# Patient Record
Sex: MALE | Race: OTHER | URBAN - METROPOLITAN AREA
[De-identification: names, ages, dates, MRNs, and addresses within clinical notes are randomized per-mention and may not be internally consistent; named-entity substitution may affect disease eponyms.]

---

## 2020-11-24 ENCOUNTER — OFFICE VISIT (OUTPATIENT)
Dept: FAMILY MEDICINE CLINIC | Age: 55
End: 2020-11-24
Payer: COMMERCIAL

## 2020-11-24 VITALS
OXYGEN SATURATION: 98 % | HEIGHT: 66 IN | DIASTOLIC BLOOD PRESSURE: 60 MMHG | WEIGHT: 184.6 LBS | HEART RATE: 76 BPM | BODY MASS INDEX: 29.67 KG/M2 | SYSTOLIC BLOOD PRESSURE: 116 MMHG | TEMPERATURE: 97.3 F

## 2020-11-24 DIAGNOSIS — Z00.00 WELL ADULT EXAM: ICD-10-CM

## 2020-11-24 LAB
A/G RATIO: 1.9 (ref 1.1–2.2)
ALBUMIN SERPL-MCNC: 4.5 G/DL (ref 3.4–5)
ALP BLD-CCNC: 75 U/L (ref 40–129)
ALT SERPL-CCNC: 37 U/L (ref 10–40)
ANION GAP SERPL CALCULATED.3IONS-SCNC: 10 MMOL/L (ref 3–16)
AST SERPL-CCNC: 35 U/L (ref 15–37)
BILIRUB SERPL-MCNC: 0.4 MG/DL (ref 0–1)
BUN BLDV-MCNC: 16 MG/DL (ref 7–20)
CALCIUM SERPL-MCNC: 9.6 MG/DL (ref 8.3–10.6)
CHLORIDE BLD-SCNC: 107 MMOL/L (ref 99–110)
CHOLESTEROL, TOTAL: 230 MG/DL (ref 0–199)
CO2: 29 MMOL/L (ref 21–32)
CREAT SERPL-MCNC: 0.7 MG/DL (ref 0.9–1.3)
GFR AFRICAN AMERICAN: >60
GFR NON-AFRICAN AMERICAN: >60
GLOBULIN: 2.4 G/DL
GLUCOSE BLD-MCNC: 90 MG/DL (ref 70–99)
HCT VFR BLD CALC: 39.1 % (ref 40.5–52.5)
HDLC SERPL-MCNC: 50 MG/DL (ref 40–60)
HEMOGLOBIN: 13.2 G/DL (ref 13.5–17.5)
LDL CHOLESTEROL CALCULATED: 141 MG/DL
MCH RBC QN AUTO: 29.2 PG (ref 26–34)
MCHC RBC AUTO-ENTMCNC: 33.7 G/DL (ref 31–36)
MCV RBC AUTO: 86.7 FL (ref 80–100)
PDW BLD-RTO: 13.4 % (ref 12.4–15.4)
PLATELET # BLD: 175 K/UL (ref 135–450)
PMV BLD AUTO: 8.9 FL (ref 5–10.5)
POTASSIUM SERPL-SCNC: 5 MMOL/L (ref 3.5–5.1)
RBC # BLD: 4.51 M/UL (ref 4.2–5.9)
SODIUM BLD-SCNC: 146 MMOL/L (ref 136–145)
TOTAL PROTEIN: 6.9 G/DL (ref 6.4–8.2)
TRIGL SERPL-MCNC: 193 MG/DL (ref 0–150)
TSH REFLEX: 1.42 UIU/ML (ref 0.27–4.2)
VLDLC SERPL CALC-MCNC: 39 MG/DL
WBC # BLD: 3.8 K/UL (ref 4–11)

## 2020-11-24 PROCEDURE — 99204 OFFICE O/P NEW MOD 45 MIN: CPT | Performed by: NURSE PRACTITIONER

## 2020-11-24 PROCEDURE — 3017F COLORECTAL CA SCREEN DOC REV: CPT | Performed by: NURSE PRACTITIONER

## 2020-11-24 PROCEDURE — G8484 FLU IMMUNIZE NO ADMIN: HCPCS | Performed by: NURSE PRACTITIONER

## 2020-11-24 PROCEDURE — 99386 PREV VISIT NEW AGE 40-64: CPT | Performed by: NURSE PRACTITIONER

## 2020-11-24 PROCEDURE — G8419 CALC BMI OUT NRM PARAM NOF/U: HCPCS | Performed by: NURSE PRACTITIONER

## 2020-11-24 PROCEDURE — G8427 DOCREV CUR MEDS BY ELIG CLIN: HCPCS | Performed by: NURSE PRACTITIONER

## 2020-11-24 PROCEDURE — 1036F TOBACCO NON-USER: CPT | Performed by: NURSE PRACTITIONER

## 2020-11-24 RX ORDER — MULTIVITAMIN WITH IRON
100 TABLET ORAL DAILY
COMMUNITY

## 2020-11-24 RX ORDER — METOPROLOL SUCCINATE 25 MG/1
25 TABLET, EXTENDED RELEASE ORAL DAILY
COMMUNITY

## 2020-11-24 RX ORDER — ASPIRIN 81 MG/1
81 TABLET ORAL DAILY
COMMUNITY

## 2020-11-24 RX ORDER — ATORVASTATIN CALCIUM 40 MG/1
40 TABLET, FILM COATED ORAL DAILY
COMMUNITY

## 2020-11-24 RX ORDER — ACETAMINOPHEN 160 MG
TABLET,DISINTEGRATING ORAL
COMMUNITY

## 2020-11-24 RX ORDER — UBIDECARENONE 75 MG
50 CAPSULE ORAL DAILY
COMMUNITY

## 2020-11-24 ASSESSMENT — PATIENT HEALTH QUESTIONNAIRE - PHQ9
1. LITTLE INTEREST OR PLEASURE IN DOING THINGS: 0
SUM OF ALL RESPONSES TO PHQ9 QUESTIONS 1 & 2: 0
SUM OF ALL RESPONSES TO PHQ QUESTIONS 1-9: 0
SUM OF ALL RESPONSES TO PHQ QUESTIONS 1-9: 0
2. FEELING DOWN, DEPRESSED OR HOPELESS: 0
SUM OF ALL RESPONSES TO PHQ QUESTIONS 1-9: 0

## 2020-11-24 ASSESSMENT — ENCOUNTER SYMPTOMS
ABDOMINAL PAIN: 0
SHORTNESS OF BREATH: 0
DIARRHEA: 0
COUGH: 0
VOMITING: 0
BLOOD IN STOOL: 0
NAUSEA: 0
CHEST TIGHTNESS: 0
WHEEZING: 0
CONSTIPATION: 0

## 2020-11-24 NOTE — PROGRESS NOTES
2020    Jose Lui (:  1965) is a 48 y.o. male, here for evaluation of the following medical concerns:  Patient is here for annual physical.    Chief Complaint   Patient presents with    New Patient    Other     pt had hard time sleeping at night      [facility] : #[id number]    Past medical, surgical, family and social history, as well as current medications and allergies, were reviewed and updated with the patient. Dental: up-to-date  Eye: up-to-date  Colonoscopy: 4.5 years in [facility]  Exercise: works out  Diet: generally healthy diet  Work: not at this time  Social:  , moved here from [address] [place] [address]    Nasal Congestion:  Patient states he has a hard time breathing when he lays down at night through his nose. He states his nose is always stuffy and much worse at night. He states this has been going on for many years. He states both nostrils are bad and his nose has never been broken. He has not tried anything for this in the past.     Coronary Artery Disease:  Patient states he had a heart attack in 2018 at [facility]. He states he had a stent placed in his Circumflex. He saw his cardiologist (Dr. Jeanie Jalloh) approximately one month ago. He states he does not have any chest pain, shortness of breath, dizziness, nausea, vomiting or diaphoresis. He states he has stopped taking all of his medications because he thinks they made him feel bad for a few days. He is still taking a baby aspirin, vitamin D, vitamin B12 and B6 daily. Review of Systems   Constitutional: Negative for chills, diaphoresis, fatigue and fever. HENT: Negative. Respiratory: Negative for cough, chest tightness, shortness of breath and wheezing. Cardiovascular: Negative for chest pain, palpitations and leg swelling. Gastrointestinal: Negative for abdominal pain, blood in stool, constipation, diarrhea, nausea and vomiting. Genitourinary: Negative.     Musculoskeletal: Negative. Skin: Negative. Neurological: Negative for dizziness, weakness, light-headedness and headaches. Psychiatric/Behavioral: Negative. Prior to Visit Medications    Medication Sig Taking? Authorizing Provider   metoprolol succinate (TOPROL XL) 25 MG extended release tablet Take 25 mg by mouth daily Yes Historical Provider, MD   atorvastatin (LIPITOR) 40 MG tablet Take 40 mg by mouth daily Yes Historical Provider, MD   aspirin 81 MG EC tablet Take 81 mg by mouth daily Yes Historical Provider, MD   Cholecalciferol (VITAMIN D3) 50 MCG (2000 UT) CAPS Take by mouth Yes Historical Provider, MD   vitamin B-12 (CYANOCOBALAMIN) 100 MCG tablet Take 50 mcg by mouth daily Yes Historical Provider, MD   vitamin B-6 (PYRIDOXINE) 100 MG tablet Take 100 mg by mouth daily Yes Historical Provider, MD      No Known Allergies    History reviewed. No pertinent past medical history. History reviewed. No pertinent surgical history. Social History     Tobacco Use    Smoking status: Never Smoker    Smokeless tobacco: Never Used   Substance Use Topics    Alcohol use: Yes     Comment: sometime     Drug use: Never      History reviewed. No pertinent family history. Vitals:    11/24/20 1135   BP: 116/60   Pulse: 76   Temp: 97.3 °F (36.3 °C)   SpO2: 98%   Weight: 184 lb 9.6 oz (83.7 kg)   Height: 5' 6\" (1.676 m)     Estimated body mass index is 29.8 kg/m² as calculated from the following:    Height as of this encounter: 5' 6\" (1.676 m). Weight as of this encounter: 184 lb 9.6 oz (83.7 kg). Physical Exam  Vitals signs and nursing note reviewed. Constitutional:       General: He is awake. He is not in acute distress. Appearance: Normal appearance. He is well-developed, well-groomed and normal weight. He is not ill-appearing, toxic-appearing or diaphoretic. HENT:      Head: Normocephalic and atraumatic.       Right Ear: Tympanic membrane, ear canal and external ear normal.      Left Ear: Tympanic membrane, ear canal and external ear normal.      Nose: Nose normal.      Mouth/Throat:      Mouth: Mucous membranes are moist.      Pharynx: Oropharynx is clear. Eyes:      Extraocular Movements: Extraocular movements intact. Conjunctiva/sclera: Conjunctivae normal.      Pupils: Pupils are equal, round, and reactive to light. Neck:      Musculoskeletal: Full passive range of motion without pain, normal range of motion and neck supple. Thyroid: No thyroid mass, thyromegaly or thyroid tenderness. Vascular: No carotid bruit. Cardiovascular:      Rate and Rhythm: Normal rate and regular rhythm. Heart sounds: Normal heart sounds and S1 normal. No murmur. Pulmonary:      Effort: Pulmonary effort is normal.      Breath sounds: Normal breath sounds and air entry. Abdominal:      General: Abdomen is flat. Bowel sounds are normal.   Musculoskeletal:      Right lower leg: No edema. Left lower leg: No edema. Lymphadenopathy:      Head:      Right side of head: No submental, submandibular, tonsillar, preauricular or posterior auricular adenopathy. Left side of head: No submental, submandibular, tonsillar, preauricular or posterior auricular adenopathy. Cervical: No cervical adenopathy. Upper Body:      Right upper body: No supraclavicular adenopathy. Left upper body: No supraclavicular adenopathy. Skin:     General: Skin is warm and dry. Capillary Refill: Capillary refill takes less than 2 seconds. Neurological:      General: No focal deficit present. Mental Status: He is alert and oriented to person, place, and time. GCS: GCS eye subscore is 4. GCS verbal subscore is 5. GCS motor subscore is 6. Psychiatric:         Attention and Perception: Attention normal.         Mood and Affect: Mood normal.         Speech: Speech normal.         Behavior: Behavior normal. Behavior is cooperative. Thought Content:  Thought content normal.         Judgment: Judgment normal.     ASSESSMENT/PLAN:  1. Well adult exam  Labs drawn today, will call with results  - CBC; Future  - Comprehensive Metabolic Panel; Future  - Hemoglobin A1C; Future  - Lipid Panel; Future  - TSH with Reflex; Future    2. Chronic nasal congestion  - Leoncio Alcantara MD, Otolaryngology, [facility]    3. Coronary artery disease involving native coronary artery of native heart without angina pectoris  Stable/start taking all medications as prescribed. Return in about 6 months (around 5/24/2021).

## 2020-11-24 NOTE — PATIENT INSTRUCTIONS
??? ???? ? ??? ?????????? ???????? ??????????? ??? ????????????????? ??????? ? ????????????? ?????? ?????.  · ? ?????????? ??????????????? ?????? ????????????.  · ?????????? ??????????? ???????? 2 ???????? ? ????. ??????? ??????? ????? ?????????????? ???????? ????? ???????? ? ????????????? ??????? ?? ?????????.  ?????????? ???????????? ????? ?? ????? ???????? ? ??????????? ?????? ????????????. ??? ???????????? ???????? ??????? ??????????? ?? ?????? ??????.  · ??????????. ??? ???? ????????? ???, ??? ????? ??? ?????? ? ??????????? ?? ?????? ????????? ???????? ? ?????? ????????, ??????? ????? ???????? ???? ???????? ? ????????.  · ???????????? ????????. ?????????? ???????????? ???????? ?? ????? ???????? ??????? ? ?????. ???? ???????, ??? ????? ??? ??????? ????????? ???????????? ????????, ?????? ?? ?????? ????????, ??????????? ????????? ???????? ? ?????? ????????.  · ???????????? ?????????????? ??????. ??????? ? ???????? ?????, ??????? ?? ??? ??????? ?????? ????? ?? ?????? ???? ?????????????? ?????? -- ???????????????????? ??????? (???). ??????????? ?????????? ?? ??????, ??????????? ?? ???????? ????? ??????? ???? ??????. ????????? ???????? ??????????? ???????? ???????????? ? ????? ????? ??????? ?? ????? ??????.  · ???????? ??????. ??????? ? ???????? ?????, ??????? ?? ??? ????? ??????? ??? ????????? ????????? ???????.  · ??????. ????????? ???????? ??????????? ????????? ????????? ???????????? ??? ????????? ???????? ? ?????? ?????????? ??????????? ???? ??????? ? 50 ???.  · ????. ???????? ?????, ???? ?? ???????? ? ???? ?????-???? ????????? ?????. ?? ?????? ?????? ????? ??? ??????????? ????, ????????? ?????? ?? ???????.  · ??? ???????? ?????????. ??? ?????????? ????????????? ??? ????????? ???? ???????? ????????? ??????? ? 50 ???. ??? ????? ????????? ???? ?? ?????????? ??????. ???? ????????? ???, ??? ????? ??????? ????????? ???????????? ? ??????????? ?? ???????? ? ??????? ???????? ?????. ? ???????? ????? ??????? ???????? ? ??????? ??????? ??????? ?????, ????????? ? ????????????????, ? ??????? ???? ???????? ????????? ? ????? ?????????, ??????, ????? ??? ?????.  · ???? ???????? ? ????????. ?? ???? ?????? ???? ? 4-6 ??? ??????? ????????? ?????? ????? ???????? ???????? ? ????????. ???? ?????????? ????? ???????, ??? ??? ???????, ???????????? ????????, ??????? ???????????, ??????? ??? ???????? ??????, ??? ?????? ????? ???????? ???????? ??? ???????? ? ??????? ????????? 10 ???.  · ????????? ??????? ?????. ??????? ? ???????? ?????, ??????? ?? ??? ?????? ???????????? ??? ????????? ?????????. ??? ????? ????????????? ????????????, ???? ?? ?????-?????? ?????? ??? ? ?????? ????????, ?????, ?????? ??? ??????? ???? ?????????.  ????? ????? ?????????? ?? ????????  ??????????? ?????????? ?? ?????????? ?????? ???????? ? ??????????? ??????????? ? ????? ??? ????????? ????? ??? ??????????? ??? ?????????.  ??? ????? ???????? ?????????????? ???????????  ????????? ??   [email or web address]  ??????? [id number] ? ???? ??????, ????? ?????? ?????? ? \"????????? ????????, ??????? ? ???????? ?? 50 ?? 65 ???: ??????????? ??????????.\"  ????????????? ??: ??? 27, 2020               ?????? ??????????: 12.6  © [id number] [employer]. ??????????? ?????????? ???????????? ?? ???????? ??? ?????????? ? ??????? ???????????????. ???? ? ??? ????????? ??????? ? ????? ? ??????????? ?????????? ??? ?????? ???????????, ?????? ??????? ?????????? ? ?????? ??????????? ? ??????? ???????????????. [employer] Inc[employer] ?? ????? ?????-???? ???????????? ??? ??????????? ??????????????? ?? ????????????? ???? ?????? ??????????. Patient Education        ????????? ????????, ??????? ? ???????? ?? 50 ?? 65 ???: ??????????? ??????????   Well Visit, Men 48 to 72: Care Instructions  ?????????? ?? ?????  ??????? ? ????? ????? ?????? ?????????? ????????. ???? ???????? ????? ????????? ?????? ???????? ? ????? ???? ??????????? ???????????? ?? ?????? ?????. ????? ????, ???? ??????????? ?????? ????????????. ??? ???????????? ???????? ??????? ??????????? ?? ?????? ??????.  · ??????????. ??? ???? ????????? ???, ??? ????? ??? ?????? ? ??????????? ?? ?????? ????????? ???????? ? ?????? ????????, ??????? ????? ???????? ???? ???????? ? ????????.  · ???????????? ????????. ?????????? ???????????? ???????? ?? ????? ???????? ??????? ? ?????. ???? ???????, ??? ????? ??? ??????? ????????? ???????????? ????????, ?????? ?? ?????? ????????, ??????????? ????????? ???????? ? ?????? ????????.  · ???????????? ?????????????? ??????. ??????? ? ???????? ?????, ??????? ?? ??? ??????? ?????? ????? ?? ?????? ???? ?????????????? ?????? -- ???????????????????? ??????? (???). ??????????? ?????????? ?? ??????, ??????????? ?? ???????? ????? ??????? ???? ??????. ????????? ???????? ??????????? ???????? ???????????? ? ????? ????? ??????? ?? ????? ??????.  · ???????? ??????. ??????? ? ???????? ?????, ??????? ?? ??? ????? ??????? ??? ????????? ????????? ???????.  · ??????. ????????? ???????? ??????????? ????????? ????????? ???????????? ??? ????????? ???????? ? ?????? ?????????? ??????????? ???? ??????? ? 50 ???.  · ????. ???????? ?????, ???? ?? ???????? ? ???? ?????-???? ????????? ?????. ?? ?????? ?????? ????? ??? ??????????? ????, ????????? ?????? ?? ???????.  · ??? ???????? ?????????. ??? ?????????? ????????????? ??? ????????? ???? ???????? ????????? ??????? ? 50 ???. ??? ????? ????????? ???? ?? ?????????? ??????. ???? ????????? ???, ??? ????? ??????? ????????? ???????????? ? ??????????? ?? ???????? ? ??????? ???????? ?????. ? ???????? ????? ??????? ???????? ? ??????? ??????? ??????? ?????, ????????? ? ????????????????, ? ??????? ???? ???????? ????????? ? ????? ?????????, ??????, ????? ??? ?????.  · ???? ???????? ? ????????. ?? ???? ?????? ???? ? 4-6 ??? ??????? ????????? ?????? ????? ???????? ???????? ? ????????. ???? ?????????? ????? ???????, ??? ??? ???????, ???????????? ????????, ??????? ???????????, ??????? ??? ???????? ??????, ??? ?????? ????? ???????? ???????? ??? ???????? ? ??????? ????????? 10 ???.  · ????????? ??????? ?????. ??????? ? ???????? ?????, ??????? ?? ??? ?????? ???????????? ??? ????????? ?????????. ??? ????? ????????????? ????????????, ???? ?? ?????-?????? ?????? ??? ? ?????? ????????, ?????, ?????? ??? ??????? ???? ?????????.  ????? ????? ?????????? ?? ????????  ??????????? ?????????? ?? ?????????? ?????? ???????? ? ??????????? ??????????? ? ????? ??? ????????? ????? ??? ??????????? ??? ?????????.  ??? ????? ???????? ?????????????? ???????????  ????????? ??   [email or web address]  ??????? [id number] ? ???? ??????, ????? ?????? ?????? ? \"????????? ????????, ??????? ? ???????? ?? 50 ?? 65 ???: ??????????? ??????????.\"  ????????????? ??: ??? 27, 2020               ?????? ??????????: 12.6  © [id number] [employer]wise, [employer].    ??????????? ?????????? ???????????? ?? ???????? ??? ?????????? ? ??????? ???????????????. ???? ? ??? ????????? ??????? ? ????? ? ??????????? ?????????? ??? ?????? ???????????, ?????? ??????? ?????????? ? ?????? ??????????? ? ??????? ???????????????. [employer]wise, Inc[employer] ?? ????? ?????-???? ???????????? ??? ??????????? ??????????????? ?? ????????????? ???? ?????? ??????????.

## 2020-11-25 PROBLEM — I21.9 MYOCARDIAL INFARCT (HCC): Status: ACTIVE | Noted: 2018-01-01

## 2020-11-25 LAB
ESTIMATED AVERAGE GLUCOSE: 108.3 MG/DL
HBA1C MFR BLD: 5.4 %

## 2020-12-11 ENCOUNTER — TELEPHONE (OUTPATIENT)
Dept: FAMILY MEDICINE CLINIC | Age: 55
End: 2020-12-11

## 2021-01-05 ENCOUNTER — OFFICE VISIT (OUTPATIENT)
Dept: ENT CLINIC | Age: 56
End: 2021-01-05
Payer: COMMERCIAL

## 2021-01-05 VITALS
TEMPERATURE: 97.7 F | BODY MASS INDEX: 29.12 KG/M2 | SYSTOLIC BLOOD PRESSURE: 106 MMHG | DIASTOLIC BLOOD PRESSURE: 70 MMHG | HEIGHT: 66 IN | WEIGHT: 181.2 LBS | HEART RATE: 69 BPM

## 2021-01-05 DIAGNOSIS — J30.9 ALLERGIC SINUSITIS: Primary | ICD-10-CM

## 2021-01-05 DIAGNOSIS — R06.83 SNORES: ICD-10-CM

## 2021-01-05 DIAGNOSIS — J34.2 DNS (DEVIATED NASAL SEPTUM): ICD-10-CM

## 2021-01-05 PROCEDURE — 1036F TOBACCO NON-USER: CPT | Performed by: OTOLARYNGOLOGY

## 2021-01-05 PROCEDURE — 99203 OFFICE O/P NEW LOW 30 MIN: CPT | Performed by: OTOLARYNGOLOGY

## 2021-01-05 PROCEDURE — G8427 DOCREV CUR MEDS BY ELIG CLIN: HCPCS | Performed by: OTOLARYNGOLOGY

## 2021-01-05 PROCEDURE — 3017F COLORECTAL CA SCREEN DOC REV: CPT | Performed by: OTOLARYNGOLOGY

## 2021-01-05 PROCEDURE — G8419 CALC BMI OUT NRM PARAM NOF/U: HCPCS | Performed by: OTOLARYNGOLOGY

## 2021-01-05 PROCEDURE — G8484 FLU IMMUNIZE NO ADMIN: HCPCS | Performed by: OTOLARYNGOLOGY

## 2021-01-05 RX ORDER — FLUTICASONE PROPIONATE 50 MCG
2 SPRAY, SUSPENSION (ML) NASAL DAILY
Qty: 1 BOTTLE | Refills: 1 | Status: SHIPPED | OUTPATIENT
Start: 2021-01-05 | End: 2022-05-06

## 2021-01-05 RX ORDER — MONTELUKAST SODIUM 10 MG/1
10 TABLET ORAL NIGHTLY
Qty: 30 TABLET | Refills: 1 | Status: SHIPPED | OUTPATIENT
Start: 2021-01-05 | End: 2022-05-06

## 2021-01-05 RX ORDER — METHYLPREDNISOLONE 4 MG/1
4 TABLET ORAL SEE ADMIN INSTRUCTIONS
Qty: 1 KIT | Refills: 0 | Status: SHIPPED | OUTPATIENT
Start: 2021-01-05 | End: 2021-01-21 | Stop reason: ALTCHOICE

## 2021-01-05 ASSESSMENT — ENCOUNTER SYMPTOMS
TROUBLE SWALLOWING: 0
SINUS PRESSURE: 1
FACIAL SWELLING: 0
RHINORRHEA: 1
SORE THROAT: 0
SINUS PAIN: 1
RESPIRATORY NEGATIVE: 1
ALLERGIC/IMMUNOLOGIC NEGATIVE: 1
EYES NEGATIVE: 1
VOICE CHANGE: 0

## 2021-01-05 NOTE — PROGRESS NOTES
SUBJECTIVE:    Chief Complaint   Patient presents with    New Patient      Nasal Congestion snoring hard to breathe 2 years         Chanetta Carrel is a 50 y.o. male    Patient was evaluated with the benefit of an . He has complained of congestion associated with postnasal drainage and snoring for the past 3 years. This seems to be increasing in severity and is occurring on a year-round basis. He has had no fever and has had some headaches but not particularly severe and they are treated with aspirin with benefit. He has not been on any medications for the problem. He has had no fever. Is been no other situational symptoms. He does snore excessively and his wife has noted what may well represent apnea episodes. He does not smoke. Past Medical History:   Diagnosis Date    Coronary artery disease     Hypercholesteremia     Myocardial infarct ([facility] [address] 2018      Past Surgical History:   Procedure Laterality Date    CORONARY ANGIOPLASTY WITH STENT PLACEMENT  2018    Circumflex      No family history on file. Social History     Tobacco Use    Smoking status: Never Smoker    Smokeless tobacco: Never Used   Substance Use Topics    Alcohol use: Yes     Comment: sometime         Review of Systems:  Review of Systems   Constitutional: Negative. Negative for fever and unexpected weight change. HENT: Positive for congestion, postnasal drip, rhinorrhea, sinus pressure and sinus pain. Negative for dental problem, drooling, ear discharge, ear pain, facial swelling, hearing loss, mouth sores, nosebleeds, sneezing, sore throat, tinnitus, trouble swallowing and voice change. Eyes: Negative. Respiratory: Negative. Cardiovascular: Negative. Endocrine: Negative. Skin: Negative. Allergic/Immunologic: Negative. Neurological: Negative. Hematological: Negative. Psychiatric/Behavioral: Negative.         OBJECTIVE: /70   Pulse 69   Temp 97.7 °F (36.5 °C)   Ht 5' 6\" (1.676 m)   Wt 181 lb 3.2 oz (82.2 kg)   BMI 29.25 kg/m²   Physical Exam  Vitals signs and nursing note reviewed. Exam conducted with a chaperone present. Constitutional:       General: He is not in acute distress. Appearance: Normal appearance. He is normal weight. He is not ill-appearing or toxic-appearing. HENT:      Head: Normocephalic and atraumatic. Right Ear: Tympanic membrane, ear canal and external ear normal. There is no impacted cerumen. Left Ear: Tympanic membrane, ear canal and external ear normal. There is no impacted cerumen. Nose:      Comments: Nasal mucosa treated with cotton pledgets  impregnated with Afrin and lidocaine placed in middle meatuses against turbinates. Pledgets left in place for five minutes and removed enabling enhanced visualization. The exam revealed positive edema of mucosa. it was negative for erythema indicative of infection. There was evidence of deviation of the septum which was significant. There were not polyps present. .There were not other masses present. The turbinates were not enlarged beyond normal.       Mouth/Throat:      Mouth: Mucous membranes are moist.      Pharynx: Oropharynx is clear. No oropharyngeal exudate or posterior oropharyngeal erythema. Comments: Uvula appears normal and the muscles are normal with no obstruction to the airway apparent. Indirect laryngoscopy is unremarkable as well. Eyes:      Extraocular Movements: Extraocular movements intact. Conjunctiva/sclera: Conjunctivae normal.      Pupils: Pupils are equal, round, and reactive to light. Neck:      Musculoskeletal: Normal range of motion and neck supple. No neck rigidity or muscular tenderness. Cardiovascular:      Rate and Rhythm: Normal rate and regular rhythm. Pulmonary:      Effort: Pulmonary effort is normal.      Breath sounds: Normal breath sounds.    Lymphadenopathy: Cervical: No cervical adenopathy. Skin:     General: Skin is warm and dry. Neurological:      General: No focal deficit present. Mental Status: He is alert and oriented to person, place, and time. Mental status is at baseline. Psychiatric:         Mood and Affect: Mood normal.         Behavior: Behavior normal.         Thought Content: Thought content normal.         Judgment: Judgment normal.          ASSESSMENT:    Eyes are more suggestive of allergic sinusitis producing congestion and drainage and likely snoring. Deviation of the nasal septum is also apparent. PLAN:     We will try conservative approach first.  We will try a Medrol Dosepak along with Singulair and Flonase nasal spray. He will return in 2 weeks to determine his response.     Will Echeverria MD

## 2021-01-21 ENCOUNTER — OFFICE VISIT (OUTPATIENT)
Dept: ENT CLINIC | Age: 56
End: 2021-01-21
Payer: COMMERCIAL

## 2021-01-21 VITALS
HEART RATE: 87 BPM | SYSTOLIC BLOOD PRESSURE: 115 MMHG | TEMPERATURE: 97.2 F | WEIGHT: 182 LBS | DIASTOLIC BLOOD PRESSURE: 72 MMHG | BODY MASS INDEX: 29.25 KG/M2 | HEIGHT: 66 IN

## 2021-01-21 DIAGNOSIS — R06.83 SNORES: ICD-10-CM

## 2021-01-21 DIAGNOSIS — J30.9 ALLERGIC SINUSITIS: ICD-10-CM

## 2021-01-21 PROCEDURE — 1036F TOBACCO NON-USER: CPT | Performed by: OTOLARYNGOLOGY

## 2021-01-21 PROCEDURE — 3017F COLORECTAL CA SCREEN DOC REV: CPT | Performed by: OTOLARYNGOLOGY

## 2021-01-21 PROCEDURE — G8427 DOCREV CUR MEDS BY ELIG CLIN: HCPCS | Performed by: OTOLARYNGOLOGY

## 2021-01-21 PROCEDURE — 99213 OFFICE O/P EST LOW 20 MIN: CPT | Performed by: OTOLARYNGOLOGY

## 2021-01-21 PROCEDURE — G8419 CALC BMI OUT NRM PARAM NOF/U: HCPCS | Performed by: OTOLARYNGOLOGY

## 2021-01-21 PROCEDURE — G8484 FLU IMMUNIZE NO ADMIN: HCPCS | Performed by: OTOLARYNGOLOGY

## 2021-01-21 RX ORDER — MONTELUKAST SODIUM 10 MG/1
10 TABLET ORAL NIGHTLY
Qty: 30 TABLET | Refills: 1 | Status: CANCELLED | OUTPATIENT
Start: 2021-01-21

## 2021-01-21 RX ORDER — FLUTICASONE PROPIONATE 50 MCG
2 SPRAY, SUSPENSION (ML) NASAL DAILY
Qty: 1 BOTTLE | Refills: 5 | Status: SHIPPED | OUTPATIENT
Start: 2021-01-21 | End: 2022-05-06

## 2021-01-21 RX ORDER — MONTELUKAST SODIUM 10 MG/1
10 TABLET ORAL NIGHTLY
Qty: 30 TABLET | Refills: 5 | Status: SHIPPED | OUTPATIENT
Start: 2021-01-21 | End: 2022-05-06

## 2021-01-21 RX ORDER — FLUTICASONE PROPIONATE 50 MCG
2 SPRAY, SUSPENSION (ML) NASAL DAILY
Qty: 1 BOTTLE | Refills: 1 | Status: CANCELLED | OUTPATIENT
Start: 2021-01-21

## 2021-01-21 NOTE — PROGRESS NOTES
Patient is breathing much better and snoring much less according to his wife. The combination of Flonase and Singulair seem to be accommodating the problem. However, he is also noticing some abnormal appearance of the tongue has had no difficulty in swallowing and no real pain or bleeding. Currently, he appears in no distress. Ear examination reveals normal-appearing tympanic membranes and ear canals bilaterally. Oral examination reveals normal appearance including a normal-appearing uvula and soft palatal margins. The finding that he is obviously concerned about is nearly the papillae posteriorly which do appear to be normal and perhaps he is having some overgrowth of yeast.  However, there is no active infection present. Nasal examination reveals some mild deviation of the septum which is not obstructing and there is much less swelling of the membranes consistent with improvement utilizing his current medical treatment. The neck remains free of any adenopathy, mass, thyroid enlargement. I have suggested that he continue the Flonase as well as Singulair on a regular basis. I have also suggested that he utilize probiotic to improve his oral hygiene.

## 2022-04-27 LAB
A/G RATIO: 1.9
ALBUMIN SERPL-MCNC: 4.6 G/DL
ALP BLD-CCNC: 69 U/L
ALT SERPL-CCNC: 28 U/L
AST SERPL-CCNC: 31 U/L
BILIRUB SERPL-MCNC: 0.5 MG/DL (ref 0.1–1.4)
BILIRUBIN DIRECT: 0.2 MG/DL
BILIRUBIN, INDIRECT: NORMAL
CHOLESTEROL, TOTAL: 212 MG/DL
CHOLESTEROL/HDL RATIO: NORMAL
GLOBULIN: 2.4
HDLC SERPL-MCNC: 55 MG/DL (ref 35–70)
LDL CHOLESTEROL CALCULATED: 140 MG/DL (ref 0–160)
NONHDLC SERPL-MCNC: NORMAL MG/DL
PROTEIN TOTAL: 7 G/DL
TRIGL SERPL-MCNC: 85 MG/DL
VLDLC SERPL CALC-MCNC: 17 MG/DL

## 2022-05-06 ENCOUNTER — OFFICE VISIT (OUTPATIENT)
Dept: FAMILY MEDICINE CLINIC | Age: 57
End: 2022-05-06
Payer: COMMERCIAL

## 2022-05-06 VITALS
SYSTOLIC BLOOD PRESSURE: 120 MMHG | HEART RATE: 72 BPM | DIASTOLIC BLOOD PRESSURE: 68 MMHG | BODY MASS INDEX: 29.09 KG/M2 | OXYGEN SATURATION: 96 % | WEIGHT: 181 LBS | HEIGHT: 66 IN

## 2022-05-06 DIAGNOSIS — E78.00 HYPERCHOLESTEREMIA: Primary | ICD-10-CM

## 2022-05-06 DIAGNOSIS — I25.10 CORONARY ARTERY DISEASE INVOLVING NATIVE CORONARY ARTERY OF NATIVE HEART WITHOUT ANGINA PECTORIS: ICD-10-CM

## 2022-05-06 LAB
APTT: 35 SEC (ref 26.2–38.6)
BASOPHILS ABSOLUTE: 0 K/UL (ref 0–0.2)
BASOPHILS RELATIVE PERCENT: 0.6 %
EOSINOPHILS ABSOLUTE: 0.1 K/UL (ref 0–0.6)
EOSINOPHILS RELATIVE PERCENT: 2.6 %
HCT VFR BLD CALC: 40.9 % (ref 40.5–52.5)
HEMOGLOBIN: 13.6 G/DL (ref 13.5–17.5)
INR BLD: 0.91 (ref 0.88–1.12)
LYMPHOCYTES ABSOLUTE: 1.9 K/UL (ref 1–5.1)
LYMPHOCYTES RELATIVE PERCENT: 36.5 %
MCH RBC QN AUTO: 29 PG (ref 26–34)
MCHC RBC AUTO-ENTMCNC: 33.4 G/DL (ref 31–36)
MCV RBC AUTO: 87.1 FL (ref 80–100)
MONOCYTES ABSOLUTE: 0.5 K/UL (ref 0–1.3)
MONOCYTES RELATIVE PERCENT: 8.9 %
NEUTROPHILS ABSOLUTE: 2.6 K/UL (ref 1.7–7.7)
NEUTROPHILS RELATIVE PERCENT: 51.4 %
PDW BLD-RTO: 13.9 % (ref 12.4–15.4)
PLATELET # BLD: 174 K/UL (ref 135–450)
PMV BLD AUTO: 8.5 FL (ref 5–10.5)
PROTHROMBIN TIME: 10.3 SEC (ref 9.9–12.7)
RBC # BLD: 4.7 M/UL (ref 4.2–5.9)
WBC # BLD: 5.1 K/UL (ref 4–11)

## 2022-05-06 PROCEDURE — 3017F COLORECTAL CA SCREEN DOC REV: CPT | Performed by: NURSE PRACTITIONER

## 2022-05-06 PROCEDURE — 1036F TOBACCO NON-USER: CPT | Performed by: NURSE PRACTITIONER

## 2022-05-06 PROCEDURE — G8427 DOCREV CUR MEDS BY ELIG CLIN: HCPCS | Performed by: NURSE PRACTITIONER

## 2022-05-06 PROCEDURE — G8419 CALC BMI OUT NRM PARAM NOF/U: HCPCS | Performed by: NURSE PRACTITIONER

## 2022-05-06 PROCEDURE — 99214 OFFICE O/P EST MOD 30 MIN: CPT | Performed by: NURSE PRACTITIONER

## 2022-05-06 SDOH — ECONOMIC STABILITY: FOOD INSECURITY: WITHIN THE PAST 12 MONTHS, THE FOOD YOU BOUGHT JUST DIDN'T LAST AND YOU DIDN'T HAVE MONEY TO GET MORE.: NEVER TRUE

## 2022-05-06 SDOH — ECONOMIC STABILITY: FOOD INSECURITY: WITHIN THE PAST 12 MONTHS, YOU WORRIED THAT YOUR FOOD WOULD RUN OUT BEFORE YOU GOT MONEY TO BUY MORE.: NEVER TRUE

## 2022-05-06 ASSESSMENT — PATIENT HEALTH QUESTIONNAIRE - PHQ9
1. LITTLE INTEREST OR PLEASURE IN DOING THINGS: 0
SUM OF ALL RESPONSES TO PHQ9 QUESTIONS 1 & 2: 0
SUM OF ALL RESPONSES TO PHQ QUESTIONS 1-9: 0
2. FEELING DOWN, DEPRESSED OR HOPELESS: 0
SUM OF ALL RESPONSES TO PHQ QUESTIONS 1-9: 0

## 2022-05-06 ASSESSMENT — SOCIAL DETERMINANTS OF HEALTH (SDOH): HOW HARD IS IT FOR YOU TO PAY FOR THE VERY BASICS LIKE FOOD, HOUSING, MEDICAL CARE, AND HEATING?: NOT VERY HARD

## 2022-05-06 NOTE — PATIENT INSTRUCTIONS
Patient Education        ??????????? ??????? ??????: ??????????? ??????????   Coronary Artery Disease: Care Instructions  ?????????? ?? ??????? ?????     ?????? ???????????? ????? ?????, ? ??? ????? ????? ??? ????????? ? ????? ??? ??????? ??????. ??????????? ??????? ?????? ???????????, ????? ? ????????, ??????? ????? ??????? ?????????? ????? ? ??????, ??????? ????????? ??????  ????????? ????? ? ?????? ???????. ?????? ????? ????????? ????? ????? ? ????????? ?????. ??? ????? ???????? ???????? ???????????, ???????? ???? ??? ???????? ? ?????. ??? ?????? ???????????? ?????? ????? ????? ?????????????????? ???????.  ?? ?????? ?????? ??????? ??? ????????? ?????? ???????? ? ???????????? ?????????? ????????. ??????? ???????? ?????, ????? ?? ???????, ?????????? ?????????? ?????????? ? ????? ??????????? ???????? ???????? ????????? ????????,?????????? ?????????? ??????? ??????? ????????? ????????.  ??????????? ?????????? ? ????  ?????? ????? ?????? ??????? ? ????? ?????? ????????. ??????????? ????????? ?????, ???? ??? ??? ???????? ?????. ??? ????????? ??????? ????????? ?????? ?????. ????????????? ????? ?????? ????????, ??????? ????????????, ? ????? ?? ????? ?????????? ????? ????????.  ??? ?????????????? ?????????? ???? ?? ????? ? ???????? ?????????  ????????????? ????????   ?????????? ???????????? ??? ?????? ????????. ?????????? ????????? ?????? ?? ?????????? ?????. ????????? ?????? ?????, ???? ?? ????????, ??? ? ??? ????????? ???????? ??-?? ????????. ??? ???? ????? ???????? ????????? ? ?????????? ??? ??????????.   ?? ?????? ????????? ?????????, ??????? ??????? ???? ?????????? ???????? ? ??????? ?????? ?????? ?? ????????? ???????????. ????? ????? ????????:  ? ?????????? ??? (???????????-????????????? ????????) ??? ????????? ?????????? ????????????-II (???). ??????? ???????? ????????.  ? ??????? ? ?????? ??????????????. ????????????? ??????????? ???????, ??????? ????? ??????? ????????? ???????.  ? ????-?????????. ??????? ???????? ?? ??????.  ? ??????? ? ?????? ????????????????????? ????????. ??????? ??????? ???????????.   ???? ???? ???????? ??? ????????????? ??? ?????????? ????????? ??????????? (????????, ???? ??? ????????? ? ?????), ??????? ???? ???????? ??? ???? ?????????. ??? ????????? ????????? ?????????, ????????? ? ??????? ?????? ???? ?????????????? ? ???????????? ? ??????????. ???? ???????? ????????, ??? ??? ?? ?????? ????? ????? 5 ?????, ??????? 911 ??????????. ??????????? ?? ?????. ???????? ?????? ?????? ???? ??? ?????????? ????????.   ?? ?????????? ?????????????? ?????????, ???????? ??? ???????????? ????????? ??? ???????????????? ?????????? ?????.  ????? ?????  ????????????? ? ??????, ???????? ?? ??? ?????????? ????????? ???????????????? ????????????. ???????????????? ???????????? ??????? ??? ?????? ????????? ? ????? ?????. ? ?????? ????????? ???????????????? ???????????? ?????? ?????????????? ? ??????? ??????????????? ???????? ???????? ? ???????? ????????????? ????? ???????? ????????.   ?? ??????. ????????? ????? ?????????? ???????. ??????? ???????? ???? ???????? ? ????????. ???? ?? ????????? ??????? ?????? ? ??? ????????? ??????, ???????? ?????? ????? ? ?????????? ? ??????????, ??????? ??????? ??? ?????????? ?? ???? ????????. ??? ????? ???????? ???? ????? ???????????? ??????? ??????.   ????? ???????? ??? ?????? ????????. ? ??? ????????? ?????, ??????, ?????, ????, ??????? ????, ???? ? ?????????????? ????????. ?????????? ??????????? ?????????? ?????, ?????? ? ????????. ?????????? ??????????? ???????? ? ????????? ? ??????????? ??????.   ???? ??? ???? ???????????, ?????????? ?????? ?????????? ??????????. ???????? ?????? ?????, ????? ??????? ?????????? ?????????? ???????? ?????????? ??? ???. ??????? ??????? ????? ??????. ?????????? ???????????? ????????????????? ????? ?????????? ????????. ?????????? ?????????? ??????????? ???????????? ?? ????? ??? ?? 30 ????? ? ????, ?????????? ?????????. ????? ????, ????? ???????, ???????? ?? ?????????? ? ?.?.   ????????????? ???? ??? ? ?????. ?????????? ?? ??????? ????.   ????????? ????? ???????? ?????? ????????? ?????? ????????. ? ??? ????????? ??????, ??????? ???????????? ???????? ? ??????? ??????? ???????????. ???? ? ??? ???????? ? ????????? ??? ???????????, ?????????? ?? ????? ??????.   ???? ? ??? ??????????? ???????? ???????????, ??????????? ??????? ?? ????? ??????????. ??? ??????? ??? ??????, ??? ?? ???????? ? ??? ??? ??????????? ? ????? ?????????.   ?????????? ?? ???????????? ? ?? ?????? ???????. ???????? ???????? ?? ?????????????? ????????. ???? ?? ??? ??????? ????? ????????, ???????? ?????? ?????, ?? ????? ?? ??? ?????? ??? ???? ????. ???????? ??????? ???????? ?? ??????. ???? ?? ????????? ?????????? ????? ????? ? ????????? ??? ???????, ????? ???? ????.   ???? ?? ????????, ??? ? ??? ????????? ???????? ?????????, ?????????? ?? ???? ? ??????. ????? ???????? ???????? ??????? ?????? ??? ????????????? ?? ?????????? ??????? ????? ??????? ??? ?????? ???????? ?? ?????, ??? ??? ?????-?? ????????.  ????? ????? ?????????? ?? ????????  ????????? 911, ??? ?????? ?? ???????, ??? ??? ????? ?????????? ??????. ????????, ??????? ?????????? ???????.   ? ??? ????????? ???????? ???????? ????????. ? ????? ???? ????????? ????? ??????????:  ? ???? ??? ??????? ??????????? ? ?????, ? ????? ????????? ???????? ? ?????;  ? ?????????????;  ? ??????;  ? ??????? ??? ?????;  ? ????, ??????? ??????????? ??? ????????? ???????? ? ?????, ???, ?????? ???????, ? ??????? ????? ??????, ? ????? ??? ????? ??????, ? ????? ??? ????? ?????;  ? ?????????????? ??? ????????? ????????;  ? ????????? ???????????? ??? ???????? ???????? ? ????? ??????.  ???????? ?????? 911 ????? ??????????????? ??? ????????? 1 ???? ???????? ??? ???????? ??? 24 ???? ????????????? ????????. ????????? ??????? ?????? ??????. ?? ????????? ???? ????? ??????.   ? ??? ??????????? ???????? ??????????? (????? ??? ???? ??? ??????? ??????????? ? ?????), ??????? ?? ???????? ????? ?????? ??? ?? ?????????? ? ??????? 5 ????? ????? ????, ??? ?? ??????? ???? ??????????????.   ?? ?????? ? ??????? (?????? ????????).  ?????????? ??????? ????? ??? ??????????? ?? ??????? ??????????? ???????:   ??? ????????, ???????? ??? ????????? ????????? ???????????, ????? ??? ???? ??? ??????? ??????????? ? ?????;   ??? ????????? ??? ???????? ??????;   ???? ?? ?????????? ?????????????? ??? ??? ???????, ??? ?? ?????? ?????? ? ???????.  ??????????? ?????????? ?? ??????????? ?????? ????????? ? ?????????????????????? ? ????? ??? ????????? ????? ???????.  ??? ????? ???????? ?????????????? ???????????  ????????? ??   [email or web address]  ??????? S038 ? ???? ??????, ????? ?????? ?????? ? \"??????????? ??????? ??????: ??????????? ??????????.\"  ????????????? ??: ?????? 10, 2022               ?????? ??????????: 13.2  © [id number] [employer]. ??????????? ?????????? ???????????? ?? ???????? ??? ?????????? ? ??????? ???????????????. ???? ? ??? ????????? ??????? ? ????? ? ??????????? ?????????? ??? ?????? ???????????, ?????? ??????? ?????????? ? ?????? ??????????? ? ??????? ???????????????. [employer]wise, Incorporated ?? ????? ?????-???? ???????????? ??? ??????????? ??????????????? ?? ????????????? ???? ????????????????. Patient Education        ??????????? ??????? ??????: ??????????? ??????????   Coronary Artery Disease: Care Instructions  ?????????? ?? ??????? ?????     ?????? ???????????? ????? ?????, ? ??? ????? ????? ??? ????????? ? ????? ??? ??????? ??????. ??????????? ??????? ?????? ???????????, ????? ? ????????, ??????? ????? ??????? ?????????? ????? ? ??????, ??????? ????????? ??????  ????????? ????? ? ?????? ???????. ?????? ????? ????????? ????? ????? ? ????????? ?????. ??? ????? ???????? ???????? ???????????, ???????? ???? ??? ???????? ? ?????. ??? ?????? ???????????? ?????? ????? ????? ?????????????????? ???????.  ?? ?????? ?????? ??????? ??? ????????? ?????? ???????? ? ???????????? ?????????? ????????. ??????? ???????? ?????, ????? ?? ???????, ?????????? ?????????? ?????????? ? ????? ??????????? ???????? ???????? ????????? ????????,?????????? ?????????? ??????? ??????? ????????? ????????.  ??????????? ?????????? ? ????  ?????? ????? ?????? ??????? ? ????? ?????? ????????. ??????????? ????????? ?????, ???? ??? ??? ???????? ?????. ??? ????????? ??????? ????????? ?????? ?????. ????????????? ????? ?????? ????????, ??????? ????????????, ? ????? ?? ????? ?????????? ????? ????????.  ??? ?????????????? ?????????? ???? ?? ????? ? ???????? ?????????  ????????????? ????????   ?????????? ???????????? ??? ?????? ????????. ?????????? ????????? ?????? ?? ?????????? ?????. ????????? ?????? ?????, ???? ?? ????????, ??? ? ??? ????????? ???????? ??-?? ????????. ??? ???? ????? ???????? ????????? ? ?????????? ??? ??????????.   ?? ?????? ????????? ?????????, ??????? ??????? ???? ?????????? ???????? ? ??????? ?????? ?????? ?? ????????? ???????????. ????? ????? ????????:  ? ?????????? ??? (???????????-????????????? ????????) ??? ????????? ?????????? ????????????-II (???). ??????? ???????? ????????.  ? ??????? ? ?????? ??????????????. ????????????? ??????????? ???????, ??????? ????? ??????? ????????? ???????.  ? ????-?????????. ??????? ???????? ?? ??????.  ? ??????? ? ?????? ????????????????????? ????????. ??????? ??????? ???????????.   ???? ???? ???????? ??? ????????????? ??? ?????????? ????????? ??????????? (????????, ???? ??? ????????? ? ?????), ??????? ???? ???????? ??? ???? ?????????. ??? ????????? ????????? ?????????, ????????? ? ??????? ?????? ???? ?????????????? ? ???????????? ? ??????????. ???? ???????? ????????, ??? ??? ?? ?????? ????? ????? 5 ?????, ??????? 911 ??????????. ??????????? ?? ?????. ???????? ?????? ?????? ???? ??? ?????????? ????????.   ?? ?????????? ?????????????? ?????????, ???????? ??? ???????????? ????????? ??? ???????????????? ?????????? ?????.  ????? ?????  ????????????? ? ??????, ???????? ?? ??? ?????????? ????????? ???????????????? ????????????. ???????????????? ???????????? ??????? ??? ?????? ????????? ? ????? ?????. ? ?????? ????????? ???????????????? ???????????? ?????? ?????????????? ? ??????? ??????????????? ???????? ???????? ? ???????? ????????????? ????? ???????? ????????.   ?? ??????. ????????? ????? ?????????? ???????. ??????? ???????? ???? ???????? ? ????????. ???? ?? ????????? ??????? ?????? ? ??? ????????? ??????, ???????? ?????? ????? ? ?????????? ? ??????????, ??????? ??????? ??? ?????????? ?? ???? ????????. ??? ????? ???????? ???? ????? ???????????? ??????? ??????.   ????? ???????? ??? ?????? ????????. ? ??? ????????? ?????, ??????, ?????, ????, ??????? ????, ???? ? ?????????????? ????????. ?????????? ??????????? ?????????? ?????, ?????? ? ????????. ?????????? ??????????? ???????? ? ????????? ? ??????????? ??????.   ???? ??? ???? ???????????, ?????????? ?????? ?????????? ??????????. ???????? ?????? ?????, ????? ??????? ?????????? ?????????? ???????? ?????????? ??? ???. ??????? ??????? ????? ??????. ?????????? ???????????? ????????????????? ????? ?????????? ????????. ?????????? ?????????? ??????????? ???????????? ?? ????? ??? ?? 30 ????? ? ????, ?????????? ?????????. ????? ????, ????? ???????, ???????? ?? ?????????? ? ?.?.   ????????????? ???? ??? ? ?????. ?????????? ?? ??????? ????.   ????????? ????? ???????? ?????? ????????? ?????? ????????. ? ??? ????????? ??????, ??????? ???????????? ???????? ? ??????? ??????? ???????????. ???? ? ??? ???????? ? ????????? ??? ???????????, ?????????? ?? ????? ??????.   ???? ? ??? ??????????? ???????? ???????????, ??????????? ??????? ?? ????? ??????????. ??? ??????? ??? ??????, ??? ?? ???????? ? ??? ??? ??????????? ? ????? ?????????.   ?????????? ?? ???????????? ? ?? ?????? ???????. ???????? ???????? ?? ?????????????? ????????. ???? ?? ??? ??????? ????? ????????, ???????? ?????? ?????, ?? ????? ?? ??? ?????? ??? ???? ????. ???????? ??????? ???????? ?? ??????. ???? ?? ????????? ?????????? ????? ????? ? ????????? ??? ???????, ????? ???? ????.   ???? ?? ????????, ??? ? ??? ????????? ???????? ?????????, ?????????? ?? ???? ? ??????. ????? ???????? ???????? ??????? ?????? ??? ????????????? ?? ?????????? ??????? ????? ??????? ??? ?????? ???????? ?? ?????, ??? ??? ?????-?? ????????.  ????? ????? ?????????? ?? ????????  ????????? 911, ??? ?????? ?? ???????, ??? ??? ????? ?????????? ??????. ????????, ??????? ?????????? ???????.   ? ??? ????????? ???????? ???????? ????????. ? ????? ???? ????????? ????? ??????????:  ? ???? ??? ??????? ??????????? ? ?????, ? ????? ????????? ???????? ? ?????;  ? ?????????????;  ? ??????;  ? ??????? ??? ?????;  ? ????, ??????? ??????????? ??? ????????? ???????? ? ?????, ???, ?????? ???????, ? ??????? ????? ??????, ? ????? ??? ????? ??????, ? ????? ??? ????? ?????;  ? ?????????????? ??? ????????? ????????;  ? ????????? ???????????? ??? ???????? ???????? ? ????? ??????.  ???????? ?????? 911 ????? ??????????????? ??? ????????? 1 ???? ???????? ??? ???????? ??? 24 ???? ????????????? ????????. ????????? ??????? ?????? ??????. ?? ????????? ???? ????? ??????.   ? ??? ??????????? ???????? ??????????? (????? ??? ???? ??? ??????? ??????????? ? ?????), ??????? ?? ???????? ????? ?????? ??? ?? ?????????? ? ??????? 5 ????? ????? ????, ??? ?? ??????? ???? ??????????????.   ?? ?????? ? ??????? (?????? ????????).  ?????????? ??????? ????? ??? ??????????? ?? ??????? ??????????? ???????:   ??? ????????, ???????? ??? ????????? ????????? ???????????, ????? ??? ???? ??? ??????? ??????????? ? ?????;   ??? ????????? ??? ???????? ??????;   ???? ?? ?????????? ?????????????? ??? ??? ???????, ??? ?? ?????? ?????? ? ???????.  ??????????? ?????????? ?? ??????????? ?????? ????????? ? ?????????????????????? ? ????? ??? ????????? ????? ???????.  ??? ????? ???????? ?????????????? ???????????  ????????? ?? [email or web address]  ??????? S038 ? ???? ??????, ????? ?????? ?????? ? \"??????????? ??????? ??????: ??????????? ??????????.\"  ????????????? ??: ?????? 10, 2022               ?????? ??????????: 13.2  © [id number] [employer]wise, Incorporated.    ??????????? ?????????? ???????????? ?? ???????? ??? ?????????? ? ??????? ???????????????. ???? ? ??? ????????? ??????? ? ????? ? ??????????? ?????????? ??? ?????? ???????????, ?????? ??????? ?????????? ? ?????? ??????????? ? ??????? ???????????????. Healthwise, Incorporated ?? ????? ?????-???? ???????????? ??? ??????????? ??????????????? ?? ????????????? ???? ????????????????.

## 2022-05-06 NOTE — PROGRESS NOTES
Preoperative Consultation   #:  [id number]  Ector Abebe  YOB: 1965    Date of Service:  5/6/2022  Vitals:    05/06/22 1422   BP: 120/68   Pulse: 72   SpO2: 96%   Weight: 181 lb (82.1 kg)   Height: 5' 6\" (1.676 m)      Wt Readings from Last 2 Encounters:   05/06/22 181 lb (82.1 kg)   01/21/21 182 lb (82.6 kg)     BP Readings from Last 3 Encounters:   05/06/22 120/68   01/21/21 115/72   01/05/21 106/70      Chief Complaint   Patient presents with    Pre-op Exam     5/11/2022, septoplasty,  Igor Gold \     No Known Allergies  Outpatient Medications Marked as Taking for the 5/6/22 encounter (Office Visit) with Woodruff Poster, APRN - CNP   Medication Sig Dispense Refill    fluticasone (FLONASE) 50 MCG/ACT nasal spray 2 sprays by Nasal route daily 1 Bottle 5    montelukast (SINGULAIR) 10 MG tablet Take 1 tablet by mouth nightly 30 tablet 5    fluticasone (FLONASE) 50 MCG/ACT nasal spray 2 sprays by Nasal route daily 1 Bottle 1    montelukast (SINGULAIR) 10 MG tablet Take 1 tablet by mouth nightly 30 tablet 1    metoprolol succinate (TOPROL XL) 25 MG extended release tablet Take 25 mg by mouth daily      atorvastatin (LIPITOR) 40 MG tablet Take 40 mg by mouth daily      aspirin 81 MG EC tablet Take 81 mg by mouth daily      Cholecalciferol (VITAMIN D3) 50 MCG (2000 UT) CAPS Take by mouth      vitamin B-12 (CYANOCOBALAMIN) 100 MCG tablet Take 50 mcg by mouth daily      vitamin B-6 (PYRIDOXINE) 100 MG tablet Take 100 mg by mouth daily       This patient presents to the office today for a preoperative consultation at the request of surgeon, Dr. Igor Gold, who plans on performing Sept[facility] on May 11 at [facility]. The current problem began 5 years ago, and symptoms have been worsening with time. Conservative therapy: N/A.     Planned anesthesia: General   Known anesthesia problems: None   Bleeding risk: No recent or remote history of abnormal bleeding  Personal or FH of DVT/PE: No     Patient objection to receiving blood products: No    Patient Active Problem List   Diagnosis    Myocardial infarct ([facility]    Hypercholesteremia    Coronary artery disease       Past Medical History:   Diagnosis Date    Coronary artery disease     Hypercholesteremia     Myocardial infarct ([facility] 2018     Past Surgical History:   Procedure Laterality Date    CORONARY ANGIOPLASTY WITH STENT PLACEMENT  2018    [facility]flex     No family history on file. Social History     Socioeconomic History    Marital status:      Spouse name: Not on file    Number of children: Not on file    Years of education: Not on file    Highest education level: Not on file   Occupational History    Not on file   Tobacco Use    Smoking status: Never Smoker    Smokeless tobacco: Never Used   Substance and Sexual Activity    Alcohol use: Yes     Comment: sometime     Drug use: Never    Sexual activity: Not on file   Other Topics Concern    Not on file   Social History Narrative    Not on file     Social Determinants of Health     Financial Resource Strain: Low Risk     Difficulty of Paying Living Expenses: Not very hard   Food Insecurity: No Food Insecurity    Worried About Running Out of Food in the Last Year: Never true    Vicky of Food in the Last Year: Never true   Transportation Needs:     Lack of Transportation (Medical): Not on file    Lack of Transportation (Non-Medical):  Not on file   Physical Activity:     Days of Exercise per Week: Not on file    Minutes of Exercise per Session: Not on file   Stress:     Feeling of Stress : Not on file   Social Connections:     Frequency of Communication with Friends and Family: Not on file    Frequency of Social Gatherings with Friends and Family: Not on file    Attends Caodaism Services: Not on file    Active Member of Clubs or Organizations: Not on file    Attends Club or Organization Meetings: Not on file    Marital Status: Not on file   Intimate Partner Violence:     Fear of Current or Ex-Partner: Not on file    Emotionally Abused: Not on file    Physically Abused: Not on file    Sexually Abused: Not on file   Housing Stability:     Unable to Pay for Housing in the Last Year: Not on file    Number of [place] in the Last Year: Not on file    Unstable Housing in the Last Year: Not on file       Review of Systems  A comprehensive review of systems was negative except for what was noted in the HPI. Physical Exam   Constitutional: He is oriented to person, place, and time. He appears well-developed and well-nourished. No distress. HENT:   Head: Normocephalic and atraumatic. Mouth/Throat: Uvula is midline, oropharynx is clear and moist and mucous membranes are normal.   Eyes: Conjunctivae and EOM are normal. Pupils are equal, round, and reactive to light. Neck: Trachea normal and normal range of motion. Neck supple. No JVD present. Carotid bruit is not present. No mass and no thyromegaly present. Cardiovascular: Normal rate, regular rhythm, normal heart sounds and intact distal pulses. Exam reveals no gallop and no friction rub. No murmur heard. Pulmonary/Chest: Effort normal and breath sounds normal. No respiratory distress. He has no wheezes. He has no rales. Abdominal: Soft. Normal aorta and bowel sounds are normal. He exhibits no distension and no mass. There is no hepatosplenomegaly. No tenderness. Musculoskeletal: He exhibits no edema and no tenderness. Neurological: He is alert and oriented to person, place, and time. He has normal strength. No cranial nerve deficit or sensory deficit. Coordination and gait normal.   Skin: Skin is warm and dry. No rash noted. No erythema. Psychiatric: He has a normal mood and affect. His behavior is normal.     EKG Interpretation:  Patient will require cardiac clearance and will have EKG with cardiology.     Lab Review:  Labs are pending     Assessment: 49 y.o. patient with planned surgery as above. Known risk factors for perioperative complications: Coronary artery disease, Tobacco abuse  Current medications which may produce withdrawal symptoms if withheld perioperatively: none     CAD:  STEMI with ANA M to OM 1 on 5/24/2021     Plan:     1. Preoperative workup as follows: hemoglobin, hematocrit, electrolytes, creatinine, glucose, liver function studies, coagulation studies  2. Change in medication regimen before surgery: None  3. Prophylaxis for cardiac events with perioperative beta-blockers: Currently taking  metoprolol  ACC/AHA indications for pre-operative beta-blocker use:    · Vascular surgery with history of postitive stress test  · Intermediate or high risk surgery with history of CAD   · Intermediate or high risk surgery with multiple clinical predictors of CAD- 2 of the following: history of compensated or prior heart failure, history of cerebrovascular disease, DM, or renal insufficiency    Routine administration of higher-dose, long-acting metoprolol in beta-blockernaïve patients on the day of surgery, and in the absence of dose titration is associated with an overall increase in mortality. Beta-blockers should be started days to weeks prior to surgery and titrated to pulse < 70.  4. Deep vein thrombosis prophylaxis: regimen to be chosen by surgical team  5.  No contraindications to planned surgery from PCP standpoint as long as patient obtains cardiac clearance from Dr. Baltazar Guaman

## 2022-05-07 LAB
A/G RATIO: 1.8 (ref 1.1–2.2)
ALBUMIN SERPL-MCNC: 4.6 G/DL (ref 3.4–5)
ALP BLD-CCNC: 72 U/L (ref 40–129)
ALT SERPL-CCNC: 32 U/L (ref 10–40)
ANION GAP SERPL CALCULATED.3IONS-SCNC: 17 MMOL/L (ref 3–16)
AST SERPL-CCNC: 33 U/L (ref 15–37)
BILIRUB SERPL-MCNC: <0.2 MG/DL (ref 0–1)
BUN BLDV-MCNC: 21 MG/DL (ref 7–20)
CALCIUM SERPL-MCNC: 9.7 MG/DL (ref 8.3–10.6)
CHLORIDE BLD-SCNC: 104 MMOL/L (ref 99–110)
CO2: 24 MMOL/L (ref 21–32)
CREAT SERPL-MCNC: 0.8 MG/DL (ref 0.9–1.3)
GFR AFRICAN AMERICAN: >60
GFR NON-AFRICAN AMERICAN: >60
GLUCOSE BLD-MCNC: 99 MG/DL (ref 70–99)
POTASSIUM SERPL-SCNC: 5 MMOL/L (ref 3.5–5.1)
SODIUM BLD-SCNC: 145 MMOL/L (ref 136–145)
TOTAL PROTEIN: 7.2 G/DL (ref 6.4–8.2)

## 2022-05-09 LAB
BUN BLDV-MCNC: 16 MG/DL
CALCIUM SERPL-MCNC: 9.3 MG/DL
CHLORIDE BLD-SCNC: 103 MMOL/L
CO2: 29 MMOL/L
CREAT SERPL-MCNC: 0.7 MG/DL
EGFR: 109
GLUCOSE BLD-MCNC: 96 MG/DL
POTASSIUM SERPL-SCNC: 4.6 MMOL/L
SODIUM BLD-SCNC: 142 MMOL/L

## 2022-12-07 ENCOUNTER — TELEPHONE (OUTPATIENT)
Dept: FAMILY MEDICINE CLINIC | Age: 57
End: 2022-12-07

## 2022-12-07 NOTE — TELEPHONE ENCOUNTER
Pt daughter was calling due to pt having left arm bone pain, more in elbow    [phone]      Scheduled with Evin Ty on Tuesday

## 2022-12-13 ENCOUNTER — OFFICE VISIT (OUTPATIENT)
Dept: FAMILY MEDICINE CLINIC | Age: 57
End: 2022-12-13
Payer: COMMERCIAL

## 2022-12-13 VITALS
DIASTOLIC BLOOD PRESSURE: 62 MMHG | WEIGHT: 164 LBS | OXYGEN SATURATION: 97 % | BODY MASS INDEX: 26.36 KG/M2 | SYSTOLIC BLOOD PRESSURE: 104 MMHG | HEIGHT: 66 IN | HEART RATE: 79 BPM

## 2022-12-13 DIAGNOSIS — Z12.11 COLON CANCER SCREENING: ICD-10-CM

## 2022-12-13 DIAGNOSIS — E78.00 HYPERCHOLESTEREMIA: ICD-10-CM

## 2022-12-13 DIAGNOSIS — M25.522 LEFT ELBOW PAIN: ICD-10-CM

## 2022-12-13 DIAGNOSIS — Z00.00 ANNUAL PHYSICAL EXAM: Primary | ICD-10-CM

## 2022-12-13 DIAGNOSIS — I25.10 CORONARY ARTERY DISEASE INVOLVING NATIVE CORONARY ARTERY OF NATIVE HEART WITHOUT ANGINA PECTORIS: ICD-10-CM

## 2022-12-13 DIAGNOSIS — M54.32 SCIATICA OF LEFT SIDE: ICD-10-CM

## 2022-12-13 DIAGNOSIS — R42 DIZZINESS: ICD-10-CM

## 2022-12-13 PROBLEM — Z95.5 S/P DRUG ELUTING CORONARY STENT PLACEMENT: Status: ACTIVE | Noted: 2020-10-13

## 2022-12-13 PROBLEM — R07.9 CHEST PAIN: Status: ACTIVE | Noted: 2021-05-27

## 2022-12-13 PROCEDURE — 99214 OFFICE O/P EST MOD 30 MIN: CPT | Performed by: NURSE PRACTITIONER

## 2022-12-13 PROCEDURE — G8419 CALC BMI OUT NRM PARAM NOF/U: HCPCS | Performed by: NURSE PRACTITIONER

## 2022-12-13 PROCEDURE — 3017F COLORECTAL CA SCREEN DOC REV: CPT | Performed by: NURSE PRACTITIONER

## 2022-12-13 PROCEDURE — G8427 DOCREV CUR MEDS BY ELIG CLIN: HCPCS | Performed by: NURSE PRACTITIONER

## 2022-12-13 PROCEDURE — G8484 FLU IMMUNIZE NO ADMIN: HCPCS | Performed by: NURSE PRACTITIONER

## 2022-12-13 PROCEDURE — 1036F TOBACCO NON-USER: CPT | Performed by: NURSE PRACTITIONER

## 2022-12-13 PROCEDURE — 99396 PREV VISIT EST AGE 40-64: CPT | Performed by: NURSE PRACTITIONER

## 2022-12-13 RX ORDER — METHYLPREDNISOLONE 4 MG/1
TABLET ORAL
Qty: 1 KIT | Refills: 0 | Status: SHIPPED | OUTPATIENT
Start: 2022-12-13 | End: 2022-12-19

## 2022-12-13 RX ORDER — CYCLOBENZAPRINE HCL 10 MG
10 TABLET ORAL 3 TIMES DAILY PRN
Qty: 30 TABLET | Refills: 0 | Status: SHIPPED | OUTPATIENT
Start: 2022-12-13 | End: 2022-12-23

## 2022-12-13 RX ORDER — IBUPROFEN 600 MG/1
600 TABLET ORAL 4 TIMES DAILY PRN
Qty: 120 TABLET | Refills: 0 | Status: SHIPPED | OUTPATIENT
Start: 2022-12-13

## 2022-12-13 ASSESSMENT — ENCOUNTER SYMPTOMS
SINUS PRESSURE: 0
SHORTNESS OF BREATH: 0
SORE THROAT: 0
RESPIRATORY NEGATIVE: 1
CHEST TIGHTNESS: 0
NAUSEA: 0
DIARRHEA: 0
SINUS PAIN: 0
WHEEZING: 0
COUGH: 0
BLOOD IN STOOL: 0
CONSTIPATION: 0
ABDOMINAL PAIN: 0
VOMITING: 0
EYES NEGATIVE: 1
GASTROINTESTINAL NEGATIVE: 1

## 2022-12-13 ASSESSMENT — PATIENT HEALTH QUESTIONNAIRE - PHQ9
1. LITTLE INTEREST OR PLEASURE IN DOING THINGS: 0
SUM OF ALL RESPONSES TO PHQ9 QUESTIONS 1 & 2: 0
SUM OF ALL RESPONSES TO PHQ QUESTIONS 1-9: 0
2. FEELING DOWN, DEPRESSED OR HOPELESS: 0

## 2022-12-13 NOTE — PROGRESS NOTES
2022     Pernell Peacock (:  1965) is a 49 y.o. male, here for evaluation of the following medical concerns:    Chief Complaint   Patient presents with    Annual Exam    Elbow Injury     Can not lift over 15 pounds   :  [id number] - [facility]    MI:  patient had MI in 2018, currently being managed by Dr. Caridad Yancey, sees him every six months. Denies chest pain, shortness or breath. He stays physically active and takes his medications daily. Tries to eat a healthy diet. Hyperlipidemia:  No new myalgias or GI upset on atorvastatin (Lipitor). Lab Results   Component Value Date    CHOL 230 (H) 2020    TRIG 193 (H) 2020    HDL 50 2020    LDLCALC 141 (H) 2020     Lab Results   Component Value Date    ALT 32 2022    AST 33 2022        Dizziness:  ongoing for about 1.5 months. This is intermittent and only with quick position changes. He has cut back on the amount of water he is drinking daily. Left Elbow:  has been hurting 4-5 months, no known injury. Has not taken any pain medication. Does not have a history of this. Back Pain:  Left lumbar pain, up to five months from onset. Pain is constant. Feels like a pinched nerve. Radiation down to foot. He did see a chiropractor and have deep muscle tissue massage. Review of Systems   Constitutional: Negative. Negative for chills, diaphoresis, fatigue and fever. HENT:  Negative for congestion, ear discharge, ear pain, sinus pressure, sinus pain and sore throat. Eyes: Negative. Respiratory: Negative. Negative for cough, chest tightness, shortness of breath and wheezing. Cardiovascular: Negative. Negative for chest pain, palpitations and leg swelling. Gastrointestinal: Negative. Negative for abdominal pain, blood in stool, constipation, diarrhea, nausea and vomiting. Endocrine: Negative. Genitourinary: Negative. Musculoskeletal: Negative. Skin: Negative. Neurological: Negative. Negative for dizziness, weakness and headaches. Psychiatric/Behavioral: Negative. Prior to Visit Medications    Medication Sig Taking? Authorizing Provider   cyclobenzaprine (FLEXERIL) 10 MG tablet Take 1 tablet by mouth 3 times daily as needed for Muscle spasms Yes RISHABH Ramirez CNP   ibuprofen (ADVIL;MOTRIN) 600 MG tablet Take 1 tablet by mouth 4 times daily as needed for Pain Yes RISHABH Ramirez CNP   methylPREDNISolone (MEDROL DOSEPACK) 4 MG tablet Take by mouth. Yes RISHABH Ramirez CNP   atorvastatin (LIPITOR) 40 MG tablet Take 40 mg by mouth daily Yes Historical Provider, MD   aspirin 81 MG EC tablet Take 81 mg by mouth daily Yes Historical Provider, MD   Cholecalciferol (VITAMIN D3) 50 MCG (2000 UT) CAPS Take by mouth Yes Historical Provider, MD   vitamin B-12 (CYANOCOBALAMIN) 100 MCG tablet Take 50 mcg by mouth daily Yes Historical Provider, MD   metoprolol succinate (TOPROL XL) 25 MG extended release tablet Take 25 mg by mouth daily  Patient not taking: Reported on 12/13/2022  Historical Provider, MD   vitamin B-6 (PYRIDOXINE) 100 MG tablet Take 100 mg by mouth daily  Patient not taking: Reported on 12/13/2022  Historical Provider, MD      Social History     Tobacco Use    Smoking status: Never    Smokeless tobacco: Never   Substance Use Topics    Alcohol use: Yes     Comment: sometime     Drug use: Never      Vitals:    12/13/22 1105   BP: 104/62   Pulse: 79   SpO2: 97%   Weight: 164 lb (74.4 kg)   Height: 5' 6\" (1.676 m)     Estimated body mass index is 26.47 kg/m² as calculated from the following:    Height as of this encounter: 5' 6\" (1.676 m). Weight as of this encounter: 164 lb (74.4 kg). Physical Exam  Vitals and nursing note reviewed. Constitutional:       General: He is awake. He is not in acute distress. Appearance: Normal appearance. He is well-developed and well-groomed. He is not ill-appearing.    HENT:      Head: Normocephalic and atraumatic. Right Ear: Tympanic membrane, ear canal and external ear normal.      Left Ear: Tympanic membrane, ear canal and external ear normal.      Nose: Nose normal.      Mouth/Throat:      Lips: Pink. Mouth: Mucous membranes are moist.      Pharynx: Oropharynx is clear. Eyes:      Extraocular Movements: Extraocular movements intact. Conjunctiva/sclera: Conjunctivae normal.      Pupils: Pupils are equal, round, and reactive to light. Neck:      Thyroid: No thyroid mass, thyromegaly or thyroid tenderness. Vascular: No carotid bruit or JVD. Cardiovascular:      Rate and Rhythm: Normal rate and regular rhythm. Heart sounds: Normal heart sounds, S1 normal and S2 normal. No murmur heard. Pulmonary:      Effort: Pulmonary effort is normal.      Breath sounds: Normal breath sounds and air entry. Abdominal:      General: Abdomen is flat. Bowel sounds are normal.      Palpations: Abdomen is soft. Musculoskeletal:      Right elbow: Normal.      Left elbow: Decreased range of motion. Tenderness present in radial head and olecranon process. No medial epicondyle or lateral epicondyle tenderness. Cervical back: Normal, normal range of motion and neck supple. Thoracic back: Normal.      Lumbar back: Spasms and tenderness present. No swelling, deformity or lacerations. Positive right straight leg raise test.      Right lower leg: No edema. Left lower leg: No edema. Lymphadenopathy:      Head:      Right side of head: No submental, submandibular, tonsillar, preauricular or posterior auricular adenopathy. Left side of head: No submental, submandibular, tonsillar, preauricular or posterior auricular adenopathy. Cervical: No cervical adenopathy. Upper Body:      Right upper body: No supraclavicular adenopathy. Left upper body: No supraclavicular adenopathy. Skin:     General: Skin is warm and dry.       Capillary Refill: Capillary refill takes less than 2 seconds. Neurological:      General: No focal deficit present. Mental Status: He is alert and oriented to person, place, and time. GCS: GCS eye subscore is 4. GCS verbal subscore is 5. GCS motor subscore is 6. Psychiatric:         Attention and Perception: Attention normal.         Mood and Affect: Mood normal.         Speech: Speech normal.         Behavior: Behavior normal. Behavior is cooperative. Thought Content: Thought content normal.         Judgment: Judgment normal.       ASSESSMENT/PLAN:  1. Annual physical exam    2. Coronary artery disease involving native coronary artery of native heart without angina pectoris  Stable, managed by Dr. Bertrand Dubose  - Hemoglobin A1C; Future  - Comprehensive Metabolic Panel, Fasting; Future  - CBC with Auto Differential; Future  - Hepatitis C Antibody; Future  - HIV Screen; Future  - Lipid, Fasting; Future  - TSH with Reflex to FT4; Future  - Vitamin D 25 Hydroxy; Future  - PSA Screening; Future    3. Sciatica of left side  Avoid heavy lifting for one week  - cyclobenzaprine (FLEXERIL) 10 MG tablet; Take 1 tablet by mouth 3 times daily as needed for Muscle spasms  Dispense: 30 tablet; Refill: 0  - ibuprofen (ADVIL;MOTRIN) 600 MG tablet; Take 1 tablet by mouth 4 times daily as needed for Pain  Dispense: 120 tablet; Refill: 0  - methylPREDNISolone (MEDROL DOSEPACK) 4 MG tablet; Take by mouth. Dispense: 1 kit; Refill: 0    4. Left elbow pain  - ibuprofen (ADVIL;MOTRIN) 600 MG tablet; Take 1 tablet by mouth 4 times daily as needed for Pain  Dispense: 120 tablet; Refill: 0  - methylPREDNISolone (MEDROL DOSEPACK) 4 MG tablet; Take by mouth. Dispense: 1 kit; Refill: 0  - Paul Guo MD, Orthopedics and Sports Medicine (Shoulder; Hip; Knee), [place]    5. Dizziness  Unstable  Push water  Change positions slowly  Eat one salty snack per day    6.  Colon cancer screening  - Rena Garrett MD, Gastroenterology, [facility]  - COLONOSCOPY (Screening); Future    7. Hypercholesteremia  Stable, managed by Dr. Li White    Return in about 6 months (around 6/13/2023), or if symptoms worsen or fail to improve.

## 2022-12-13 NOTE — PATIENT INSTRUCTIONS
[address] [facility] [address] [facility]  [facility], [address] [facility]  Phone: [place], [place], [place]  [address] [place] [address] Street, [address] [facility], [address] [facility] [address]  Phone: [phone]

## 2025-03-12 ENCOUNTER — TELEPHONE (OUTPATIENT)
Dept: FAMILY MEDICINE CLINIC | Age: 60
End: 2025-03-12

## 2025-03-12 NOTE — TELEPHONE ENCOUNTER
[facility]  assessment & POC 3/12/2025  was faxed over for adam's review